# Patient Record
Sex: MALE
[De-identification: names, ages, dates, MRNs, and addresses within clinical notes are randomized per-mention and may not be internally consistent; named-entity substitution may affect disease eponyms.]

---

## 2020-10-13 ENCOUNTER — HOSPITAL ENCOUNTER (OUTPATIENT)
Dept: HOSPITAL 19 - SDCO | Age: 60
Discharge: HOME | End: 2020-10-13
Attending: UROLOGY
Payer: COMMERCIAL

## 2020-10-13 VITALS — SYSTOLIC BLOOD PRESSURE: 134 MMHG | HEART RATE: 78 BPM | TEMPERATURE: 97.2 F | DIASTOLIC BLOOD PRESSURE: 81 MMHG

## 2020-10-13 VITALS — SYSTOLIC BLOOD PRESSURE: 122 MMHG | DIASTOLIC BLOOD PRESSURE: 63 MMHG | HEART RATE: 61 BPM

## 2020-10-13 VITALS — SYSTOLIC BLOOD PRESSURE: 141 MMHG | HEART RATE: 77 BPM | DIASTOLIC BLOOD PRESSURE: 75 MMHG

## 2020-10-13 VITALS — HEART RATE: 74 BPM | DIASTOLIC BLOOD PRESSURE: 75 MMHG | SYSTOLIC BLOOD PRESSURE: 141 MMHG

## 2020-10-13 VITALS — HEART RATE: 71 BPM | DIASTOLIC BLOOD PRESSURE: 70 MMHG | SYSTOLIC BLOOD PRESSURE: 137 MMHG

## 2020-10-13 VITALS — SYSTOLIC BLOOD PRESSURE: 135 MMHG | DIASTOLIC BLOOD PRESSURE: 69 MMHG | HEART RATE: 64 BPM

## 2020-10-13 VITALS — TEMPERATURE: 97.7 F

## 2020-10-13 VITALS — HEIGHT: 68 IN | BODY MASS INDEX: 25.89 KG/M2 | WEIGHT: 170.86 LBS

## 2020-10-13 VITALS — DIASTOLIC BLOOD PRESSURE: 62 MMHG | SYSTOLIC BLOOD PRESSURE: 120 MMHG | HEART RATE: 79 BPM

## 2020-10-13 DIAGNOSIS — Z79.4: ICD-10-CM

## 2020-10-13 DIAGNOSIS — I10: ICD-10-CM

## 2020-10-13 DIAGNOSIS — F17.210: ICD-10-CM

## 2020-10-13 DIAGNOSIS — I25.2: ICD-10-CM

## 2020-10-13 DIAGNOSIS — C60.9: Primary | ICD-10-CM

## 2020-10-13 DIAGNOSIS — E11.9: ICD-10-CM

## 2020-10-13 DIAGNOSIS — E03.9: ICD-10-CM

## 2020-10-13 DIAGNOSIS — A63.0: ICD-10-CM

## 2020-10-13 NOTE — NUR
More awake and sitting up drinking coffe and water.  Denies pain or nausea.
Oxygen removed and sats 100%.

## 2020-10-13 NOTE — NUR
Offered snack.  States he prefers to eat after discharge.  Drinking second cup
of coffee and more water.  Continues to deny nausea.  Spouse remains in the
room.

## 2020-10-13 NOTE — NUR
Patient returns to room 7 per cart from PACU accompanied by Loren HUGHES and is
awake and alert.  Room air sats 93% and temp 97.7.  Dressing clean and dry on
to the incisional areas.  IV fluids infusing.  Spouse in room.  Call light in
reach and allowed to rest.

## 2020-10-13 NOTE — NUR
IV converted to INT.  Assisted up to the bathroom and gait is steady.  Voids
and returns to room.  Patient dresses self.

## 2020-10-13 NOTE — NUR
Room air sats down to 88%.  Placed on oxygen at 2L per nasal cannula and sats
up to 93%.  Resting and sips on water and coffee.

## 2020-10-13 NOTE — NUR
Patient dismissed to home driven by spouse and taken to the front door per
wheelchair by this RN with instructions in hand.